# Patient Record
Sex: FEMALE | Race: WHITE | Employment: UNEMPLOYED | URBAN - METROPOLITAN AREA
[De-identification: names, ages, dates, MRNs, and addresses within clinical notes are randomized per-mention and may not be internally consistent; named-entity substitution may affect disease eponyms.]

---

## 2021-04-27 NOTE — PROGRESS NOTES
Assessment and Plan   Diagnoses and all orders for this visit:    1. Anxiety  -     sertraline (ZOLOFT) 50 mg tablet; Take 1 Tab by mouth daily. Initially was on this medication with stress after her fourth child. He eventually tapered off the medication. However, with the new move, she has had increased rest and went back on the medication in December. Working well for her. Continue Zoloft 50 mg. No medication changes recommended. Okay to wean if she feels she does not need it anymore    2. Androgen excess  -     spironolactone (ALDACTONE) 100 mg tablet; Take 1 Tab by mouth daily. 3. PCOS (polycystic ovarian syndrome)  Diagnosed with PCOS when she was younger. Was started on spironolactone after her pregnancy due to significant overproduction of oil and skin issues. She has tried coming off the medications in the past but had return of symptoms. Advised to continue spironolactone 100 mg daily. If she is interested in coming off the medication in the future, we could try. 4. Fatigue, unspecified type  -     CBC WITH AUTOMATED DIFF; Future  -     HEMOGLOBIN A1C WITH EAG; Future  -     METABOLIC PANEL, COMPREHENSIVE; Future  -     LIPID PANEL; Future  -     TSH 3RD GENERATION; Future  -     T4, FREE; Future  -     IRON PROFILE; Future  -     FERRITIN; Future  -     VITAMIN B12 & FOLATE; Future  -     VITAMIN D, 25 HYDROXY; Future  Reports sleeping well and having a good diet. Has had a hard time with Covid and the move. Likely multifactorial.  We will get lab work today and discuss further at next visit    5. Mild intermittent asthma without complication  Not much of an issue since her childhood. Will have flares when her allergies are bad. Benefits, risks, possible drug interactions, and side effects of all new medications were reviewed with the patient. Pt verbalized understanding.     Return to clinic: Earliest convenience to talk about fatigue    An electronic signature was used to authenticate this note. Phillip Chacon MD  [facility]  2021    Future Appointments   Date Time Provider Quin Batron    31:99 AM Jacob Rodriguez MD [facility] BS AMB        History of Present Illness   Chief Complaint   Establish care    Jimmy Long is a 38 y.o. female         Review of Systems   Constitutional: Negative for chills and fever. HENT: Negative for hearing loss. Eyes: Negative for blurred vision. Respiratory: Negative for shortness of breath. Cardiovascular: Negative for chest pain. Gastrointestinal: Negative for abdominal pain, blood in stool, constipation, diarrhea, melena, nausea and vomiting. Genitourinary: Negative for dysuria and hematuria. Musculoskeletal: Negative for joint pain. Skin: Negative for rash. Neurological: Negative for headaches. Past Medical History     Allergies   Allergen Reactions    Cervidil [Dinoprostone] Swelling     Severe swelling of cervical and vaginal area    Erythromycin Nausea and Vomiting        Current Outpatient Medications   Medication Sig    multivitamin (ONE A DAY) tablet Take 1 Tab by mouth daily.  sertraline (ZOLOFT) 50 mg tablet Take 1 Tab by mouth daily.  spironolactone (ALDACTONE) 100 mg tablet Take 1 Tab by mouth daily.  cetirizine (ZYRTEC) 10 mg tablet Take 10 mg by mouth daily as needed. No current facility-administered medications for this visit.            Patient Active Problem List   Diagnosis Code    Anxiety F41.9    Androgen excess E28.1    PCOS (polycystic ovarian syndrome) E28.2    Fatigue, unspecified type R53.83    Mild intermittent asthma without complication F85.40     Past Surgical History:   Procedure Laterality Date    HX  SECTION      HX OTHER SURGICAL  2002    wisdom teeth      Social History     Tobacco Use    Smoking status: Never Smoker    Smokeless tobacco: Never Used   Substance Use Topics    Alcohol use: Yes     Comment: 1-2 drinks/week      Family History   Problem Relation Age of Onset    Diabetes Father     Heart Disease Father         cabg, no MI    Hypertension Father     Diabetes Maternal Grandfather     Stroke Maternal Grandfather     Cancer Neg Hx         Physical Exam   Vitals:       Visit Vitals  /68 (BP 1 Location: Left upper arm, BP Patient Position: Sitting, BP Cuff Size: Adult)   Pulse 72   Temp 98.5 °F (36.9 °C) (Oral)   Resp 16   Ht 5' 4\" (1.626 m)   Wt 131 lb (59.4 kg)   LMP 04/08/2021 (Approximate)   SpO2 99%   Breastfeeding No   BMI 22.49 kg/m²        Physical Exam  Constitutional:       General: She is not in acute distress. Appearance: She is well-developed. HENT:      Right Ear: Tympanic membrane, ear canal and external ear normal.      Left Ear: Tympanic membrane, ear canal and external ear normal.   Eyes:      Extraocular Movements: Extraocular movements intact. Conjunctiva/sclera: Conjunctivae normal.   Neck:      Musculoskeletal: Neck supple. Cardiovascular:      Rate and Rhythm: Normal rate and regular rhythm. Pulses: Normal pulses. Heart sounds: No murmur. No friction rub. No gallop. Pulmonary:      Effort: No respiratory distress. Breath sounds: No wheezing, rhonchi or rales. Abdominal:      General: Bowel sounds are normal. There is no distension. Palpations: Abdomen is soft. There is no hepatomegaly, splenomegaly or mass. Tenderness: There is no abdominal tenderness. There is no guarding. Skin:     General: Skin is warm. Findings: No rash. Neurological:      Mental Status: She is alert.

## 2021-04-28 ENCOUNTER — OFFICE VISIT (OUTPATIENT)
Dept: INTERNAL MEDICINE CLINIC | Age: 36
End: 2021-04-28
Payer: COMMERCIAL

## 2021-04-28 VITALS
HEIGHT: 64 IN | HEART RATE: 72 BPM | DIASTOLIC BLOOD PRESSURE: 68 MMHG | OXYGEN SATURATION: 99 % | WEIGHT: 131 LBS | SYSTOLIC BLOOD PRESSURE: 110 MMHG | BODY MASS INDEX: 22.36 KG/M2 | TEMPERATURE: 98.5 F | RESPIRATION RATE: 16 BRPM

## 2021-04-28 DIAGNOSIS — R53.83 FATIGUE, UNSPECIFIED TYPE: ICD-10-CM

## 2021-04-28 DIAGNOSIS — F41.9 ANXIETY: Primary | ICD-10-CM

## 2021-04-28 DIAGNOSIS — E28.1 ANDROGEN EXCESS: ICD-10-CM

## 2021-04-28 DIAGNOSIS — J45.20 MILD INTERMITTENT ASTHMA WITHOUT COMPLICATION: ICD-10-CM

## 2021-04-28 DIAGNOSIS — E28.2 PCOS (POLYCYSTIC OVARIAN SYNDROME): ICD-10-CM

## 2021-04-28 PROCEDURE — 99204 OFFICE O/P NEW MOD 45 MIN: CPT | Performed by: INTERNAL MEDICINE

## 2021-04-28 RX ORDER — BISMUTH SUBSALICYLATE 262 MG
1 TABLET,CHEWABLE ORAL DAILY
COMMUNITY

## 2021-04-28 RX ORDER — SERTRALINE HYDROCHLORIDE 50 MG/1
50 TABLET, FILM COATED ORAL DAILY
Qty: 90 TAB | Refills: 1 | Status: SHIPPED | OUTPATIENT
Start: 2021-04-28 | End: 2022-06-20

## 2021-04-28 RX ORDER — SPIRONOLACTONE 100 MG/1
100 TABLET, FILM COATED ORAL DAILY
Qty: 90 TAB | Refills: 3 | Status: SHIPPED | OUTPATIENT
Start: 2021-04-28 | End: 2022-06-20

## 2022-03-18 PROBLEM — E28.2 PCOS (POLYCYSTIC OVARIAN SYNDROME): Status: ACTIVE | Noted: 2021-04-28

## 2022-03-18 PROBLEM — F41.9 ANXIETY: Status: ACTIVE | Noted: 2021-04-28

## 2022-03-19 PROBLEM — E28.1 ANDROGEN EXCESS: Status: ACTIVE | Noted: 2021-04-28

## 2022-03-20 PROBLEM — J45.20 MILD INTERMITTENT ASTHMA WITHOUT COMPLICATION: Status: ACTIVE | Noted: 2021-04-28

## 2022-03-20 PROBLEM — R53.83 FATIGUE, UNSPECIFIED TYPE: Status: ACTIVE | Noted: 2021-04-28

## 2022-06-20 ENCOUNTER — OFFICE VISIT (OUTPATIENT)
Dept: INTERNAL MEDICINE CLINIC | Age: 37
End: 2022-06-20
Payer: COMMERCIAL

## 2022-06-20 VITALS
WEIGHT: 129 LBS | HEIGHT: 64 IN | HEART RATE: 70 BPM | DIASTOLIC BLOOD PRESSURE: 69 MMHG | SYSTOLIC BLOOD PRESSURE: 109 MMHG | BODY MASS INDEX: 22.02 KG/M2 | TEMPERATURE: 98 F | OXYGEN SATURATION: 100 %

## 2022-06-20 DIAGNOSIS — F41.9 ANXIETY: ICD-10-CM

## 2022-06-20 DIAGNOSIS — R00.2 PALPITATIONS: ICD-10-CM

## 2022-06-20 DIAGNOSIS — E28.2 PCOS (POLYCYSTIC OVARIAN SYNDROME): ICD-10-CM

## 2022-06-20 DIAGNOSIS — Z11.59 NEED FOR HEPATITIS C SCREENING TEST: ICD-10-CM

## 2022-06-20 DIAGNOSIS — G43.011 INTRACTABLE MIGRAINE WITHOUT AURA AND WITH STATUS MIGRAINOSUS: Primary | ICD-10-CM

## 2022-06-20 PROCEDURE — 93000 ELECTROCARDIOGRAM COMPLETE: CPT | Performed by: INTERNAL MEDICINE

## 2022-06-20 PROCEDURE — 99215 OFFICE O/P EST HI 40 MIN: CPT | Performed by: INTERNAL MEDICINE

## 2022-06-20 PROCEDURE — 96372 THER/PROPH/DIAG INJ SC/IM: CPT | Performed by: INTERNAL MEDICINE

## 2022-06-20 RX ORDER — KETOROLAC TROMETHAMINE 30 MG/ML
60 INJECTION, SOLUTION INTRAMUSCULAR; INTRAVENOUS ONCE
Status: COMPLETED | OUTPATIENT
Start: 2022-06-20 | End: 2022-06-20

## 2022-06-20 RX ORDER — KETOROLAC TROMETHAMINE 30 MG/ML
60 INJECTION, SOLUTION INTRAMUSCULAR; INTRAVENOUS ONCE
Status: DISCONTINUED | OUTPATIENT
Start: 2022-06-20 | End: 2022-06-20 | Stop reason: SDUPTHER

## 2022-06-20 RX ORDER — KETOROLAC TROMETHAMINE 30 MG/ML
60 INJECTION, SOLUTION INTRAMUSCULAR; INTRAVENOUS ONCE
Qty: 1 ML | Refills: 0
Start: 2022-06-20 | End: 2022-06-20

## 2022-06-20 RX ORDER — DEXTROAMPHETAMINE SACCHARATE, AMPHETAMINE ASPARTATE, DEXTROAMPHETAMINE SULFATE AND AMPHETAMINE SULFATE 1.25; 1.25; 1.25; 1.25 MG/1; MG/1; MG/1; MG/1
5 TABLET ORAL 2 TIMES DAILY
COMMUNITY
Start: 2022-06-02

## 2022-06-20 RX ORDER — SUMATRIPTAN 50 MG/1
50 TABLET, FILM COATED ORAL
Qty: 10 TABLET | Refills: 0 | Status: SHIPPED | OUTPATIENT
Start: 2022-06-20 | End: 2022-06-20

## 2022-06-20 RX ADMIN — KETOROLAC TROMETHAMINE 60 MG: 30 INJECTION, SOLUTION INTRAMUSCULAR; INTRAVENOUS at 16:50

## 2022-06-20 NOTE — ASSESSMENT & PLAN NOTE
Driving home from [place] last week - started getting tunnel vision, blurred vision, felt like might pass out   Been feeling \"heavy\" since then  Has had a HA present since then that hasn't gone away, lot of pressure in her head and ears  Morning of episode - was feeling really tired   Drove to [place] the next day - still having a HA  Had a couple more episodes while walking in the store - tunnel vision, couldn't think/see straight  Reaction time slower  Fumbling a lot   Having a hard time putting thoughts together   A lot of pressure in head/ears   Doesn't feel comfortable driving because of symptoms   \"I feel drunk\"  Maybe not getting a lot of rest while in [place]  Light makes it worse   Lot of eye tearing when was driving from [place]   No nausea, vomiting  Before today, would rate pressure 8/10  Has been taking advil as needed   Maybe not eating a lot while in [place], but has been eating more in the past week  Drinking a lot of fluids   No CP, SOB  occl palpitations in the past week   Neck/back issues - usually sees chiropractor monthly but hasn't been as consistent with that  TIA vs migraine - 2009, 2mo prior came off OCPs, negative workup - This episode lasted only a couple of hours   Took a friend's xanax - helped with sleep  Neuro exam normal   Cardiac exam normal; ekg with short TX but otherwise normal; sx less likely cardiac  Orthostatics negative  HEENT normal - unlikely infection  Migraine?  rec toradol shot and imitrex if needed.   Advised to call if sx do not improve

## 2022-06-20 NOTE — ASSESSMENT & PLAN NOTE
4/28/21 - Initially was on this medication with stress after her fourth child. Praful Vang eventually tapered off the medication. Carole Judy, with the new move, she has had increased rest and went back on the medication in December.  Working well for her.     Continue Zoloft 50 mg.  No medication changes recommended. Loralie Route to wean if she feels she does not need it anymore  ===  Treating ADHD has helped the anxiety, off sertraline now

## 2022-06-20 NOTE — ASSESSMENT & PLAN NOTE
4/28/21-Diagnosed with PCOS when she was younger. Benson Roque started on spironolactone after her pregnancy due to significant overproduction of oil and skin issues. Sania Chano has tried coming off the medications in the past but had return of symptoms.     Advised to continue spironolactone 100 mg daily.  If she is interested in coming off the medication in the future, we could try.  ===  Off spironolactone in the past couple months

## 2022-06-20 NOTE — PROGRESS NOTES
Identified pt with two pt identifiers. Reviewed record in preparation for visit and have obtained necessary documentation. All patient medications has been reviewed. Chief Complaint   Patient presents with    Dizziness     started last Monday blurry vision, foggy, fatigue and lightheadness    Head Pain    Neck Pain     Additional information about chief complaint:    Visit Vitals  /69 (BP 1 Location: Left upper arm, BP Patient Position: Sitting, BP Cuff Size: Adult)   Pulse 70   Temp 98 °F (36.7 °C) (Temporal)   Ht 5' 4\" (1.626 m)   Wt 129 lb (58.5 kg)   SpO2 100%   BMI 22.14 kg/m²       Health Maintenance Due   Topic    Hepatitis C Screening     Depression Screen     COVID-19 Vaccine (1)    Pneumococcal 0-64 years (1 - PCV)    Cervical cancer screen        1. Have you been to the ER, urgent care clinic since your last visit? Hospitalized since your last visit? No    2. Have you seen or consulted any other health care providers outside of the [address] [place] [address] since your last visit? Include any pap smears or colon screening.  No

## 2022-06-22 ENCOUNTER — TELEPHONE (OUTPATIENT)
Dept: INTERNAL MEDICINE CLINIC | Age: 37
End: 2022-06-22

## 2022-06-22 DIAGNOSIS — G43.011 INTRACTABLE MIGRAINE WITHOUT AURA AND WITH STATUS MIGRAINOSUS: Primary | ICD-10-CM

## 2022-06-22 RX ORDER — KETOROLAC TROMETHAMINE 10 MG/1
10 TABLET, FILM COATED ORAL
Qty: 10 TABLET | Refills: 0 | Status: SHIPPED | OUTPATIENT
Start: 2022-06-22 | End: 2022-07-20

## 2022-06-22 NOTE — TELEPHONE ENCOUNTER
Yes, she can take another tab of imitrex (max 2 tabs/day). If the toradol worked, I sent in a PO version of toradol to the pharmacy which she can try as needed. Okay to take po toradol with imitrex.   Call if no improvement - could consider steroid pack for intractable migraine

## 2022-06-22 NOTE — TELEPHONE ENCOUNTER
Pt was seen on Monday given Toradol for migraine, immediatly starting working within 5 hours she felt nml. Pt didn't feel the Imitrex the following day because she felt fine. Pt got a massage yesterday to loosen neck muscles. Pt woke up this AM with frontal migraine pain, back of right side of neck, right eyebrow. Pt took Advil which helps with pain but she is still having dizziness, slow reaction time. Pt got the Imitrex from the pharmacy today, took 1 tablet around 10am. No improvement since taking Imitrex. Pain is improved since taking advil but the lightheaded feeling, can't function, light sensitivity is still present. Reflex time is slow. All of these symptoms went away with the Toradol injection. These sx restarted this AM.     Can she take more than 1 Imitrex? Due to sx she doesn't feel comfortable driving.

## 2022-07-20 ENCOUNTER — OFFICE VISIT (OUTPATIENT)
Dept: INTERNAL MEDICINE CLINIC | Age: 37
End: 2022-07-20
Payer: COMMERCIAL

## 2022-07-20 VITALS
WEIGHT: 128 LBS | DIASTOLIC BLOOD PRESSURE: 64 MMHG | RESPIRATION RATE: 17 BRPM | HEART RATE: 83 BPM | HEIGHT: 64 IN | SYSTOLIC BLOOD PRESSURE: 100 MMHG | OXYGEN SATURATION: 97 % | BODY MASS INDEX: 21.85 KG/M2

## 2022-07-20 DIAGNOSIS — F98.8 ATTENTION DEFICIT DISORDER (ADD) WITHOUT HYPERACTIVITY: ICD-10-CM

## 2022-07-20 DIAGNOSIS — G43.011 INTRACTABLE MIGRAINE WITHOUT AURA AND WITH STATUS MIGRAINOSUS: ICD-10-CM

## 2022-07-20 DIAGNOSIS — Z00.00 WELL ADULT EXAM: Primary | ICD-10-CM

## 2022-07-20 PROCEDURE — 99395 PREV VISIT EST AGE 18-39: CPT | Performed by: INTERNAL MEDICINE

## 2022-07-20 RX ORDER — SUMATRIPTAN 50 MG/1
50 TABLET, FILM COATED ORAL
COMMUNITY

## 2022-07-20 NOTE — PROGRESS NOTES
Assessment and Plan     Ferritin 14. Iron saturation low 18%. B12 319. Folate normal.  Vitamin D 51. Thyroid studies normal.  Hep C negative. Lipid panel normal.  A1c normal. CMP normal.  CBC normal     Iron low side but normal Hgb 12.8 -recommend multivitamin with iron  Labs discussed with patient    1. Well adult exam  Assessment & Plan: Thinks pap august 2019, Dr. Dove Him VPFW -signed release  Did get covid booster   She would like to hold off on pneumonia vaccine for now  2. Intractable migraine without aura and with status migrainosus  Assessment & Plan:  Symptoms resolved after Toradol shot after last visit. Did return but resolved again with Imitrex. Did not need to take any oral Toradol  Symptoms were likely related to migraine. Continue Imitrex as needed  3. Attention deficit disorder (ADD) without hyperactivity  Assessment & Plan:   monitored by specialist. No acute findings meriting change in the plan   VPFW prescribing adderall - women's health consultation     Benefits, risks, possible drug interactions, and side effects of all new medications were reviewed with the patient. Pt verbalized understanding. Return to clinic: 1 year for physical or earlier if needed  originally from [facility] [place], lived in [place], moved to [email or web address] for [employer]'s job but he went virtual so they came back  4 kids - 11, 10, 7, 8yo  reaing, piano, gardening, walking, [place], yoga    An electronic signature was used to authenticate this note. Edwin Roy MD  [facility]  7/20/2022    No future appointments. History of Present Illness   Chief Complaint   Physical    Curtistine Rio is a 43 y.o. female         Review of Systems   Constitutional:  Negative for chills and fever. HENT:  Negative for hearing loss. Eyes:  Negative for blurred vision. Respiratory:  Negative for shortness of breath. Cardiovascular:  Negative for chest pain.    Gastrointestinal: Negative for abdominal pain, blood in stool, constipation, diarrhea, melena, nausea and vomiting. Genitourinary:  Negative for dysuria and hematuria. Musculoskeletal:  Negative for joint pain. Skin:  Negative for rash. Neurological:  Negative for headaches. Past Medical History     Allergies   Allergen Reactions    Cervidil [Dinoprostone] Swelling     Severe swelling of cervical and vaginal area    Erythromycin Nausea and Vomiting        Current Outpatient Medications   Medication Sig    SUMAtriptan (IMITREX) 50 mg tablet Take 50 mg by mouth once as needed. dextroamphetamine-amphetamine (ADDERALL) 5 mg tablet Take 5 mg by mouth two (2) times a day. multivitamin (ONE A DAY) tablet Take 1 Tab by mouth daily. cetirizine (ZYRTEC) 10 mg tablet Take 10 mg by mouth daily as needed. No current facility-administered medications for this visit.           Patient Active Problem List   Diagnosis Code    Anxiety F41.9    Androgen excess E28.1    PCOS (polycystic ovarian syndrome) E28.2    Fatigue, unspecified type R53.83    Mild intermittent asthma without complication L68.27    Intractable migraine without aura and with status migrainosus G43.011    Well adult exam Z00.00    Attention deficit disorder (ADD) without hyperactivity F98.8     Past Surgical History:   Procedure Laterality Date    HX  SECTION      HX OTHER SURGICAL  2002    wisdom teeth      Social History     Tobacco Use    Smoking status: Never    Smokeless tobacco: Never   Substance Use Topics    Alcohol use: Yes     Comment: 0-2 drinks/week      Family History   Problem Relation Age of Onset    Diabetes Father     Heart Disease Father         cabg, no MI    Hypertension Father     Diabetes Maternal Grandfather     Stroke Maternal Grandfather     Cancer Neg Hx         Physical Exam   Vitals:       Visit Vitals  /64   Pulse 83   Resp 17   Ht 5' 4\" (1.626 m)   Wt 128 lb (58.1 kg)   LMP 2022 (Exact Date)   SpO2 97%   BMI 21.97 kg/m²        Physical Exam  Constitutional:       General: She is not in acute distress. Appearance: She is well-developed. HENT:      Right Ear: Tympanic membrane, ear canal and external ear normal.      Left Ear: Tympanic membrane, ear canal and external ear normal.   Eyes:      Extraocular Movements: Extraocular movements intact. Conjunctiva/sclera: Conjunctivae normal.   Cardiovascular:      Rate and Rhythm: Normal rate and regular rhythm. Pulses: Normal pulses. Heart sounds: No murmur heard. No friction rub. No gallop. Pulmonary:      Effort: No respiratory distress. Breath sounds: No wheezing, rhonchi or rales. Abdominal:      General: Bowel sounds are normal. There is no distension. Palpations: Abdomen is soft. There is no hepatomegaly, splenomegaly or mass. Tenderness: There is no abdominal tenderness. There is no guarding. Musculoskeletal:      Cervical back: Neck supple. Right lower leg: No edema. Left lower leg: No edema. Lymphadenopathy:      Cervical: No cervical adenopathy. Skin:     General: Skin is warm. Findings: No rash. Neurological:      Mental Status: She is alert.

## 2022-07-20 NOTE — ASSESSMENT & PLAN NOTE
Thinks pap august 2019, Dr. Glen Holm VPFW -signed release  Did get covid booster   She would like to hold off on pneumonia vaccine for now

## 2022-07-20 NOTE — ASSESSMENT & PLAN NOTE
monitored by specialist. No acute findings meriting change in the plan   VPFW prescribing adderall - women's health consultation

## 2022-07-20 NOTE — PROGRESS NOTES
Room:     Identified pt with two pt identifiers(name and ). Reviewed record in preparation for visit and have obtained necessary documentation. All patient medications has been reviewed. Chief Complaint   Patient presents with    Complete Physical       Health Maintenance Due   Topic    Pneumococcal 0-64 years (1 - PCV)    Cervical cancer screen     COVID-19 Vaccine (3 - Booster for Moderna series)       Vitals:    22 1430   Weight: 128 lb (58.1 kg)   Height: 5' 4\" (1.626 m)   PainSc:   0 - No pain   LMP: 2022       4. Have you been to the ER, urgent care clinic since your last visit? Hospitalized since your last visit? {Yes when where and reason for visit:}    5. Have you seen or consulted any other health care providers outside of the [address] [place] [address] since your last visit? Include any pap smears or colon screening. {Yes when where and reason for visit:}    6. Would you like to receive your flu shot today? {YES/NO:83456}    8. Do you have an Advanced Directive/ Living Will in place? {YES/NO:34834}  If yes, do we have a copy on file {YES/NO:26389}  If no, would you like information {YES/NO:67589}    Patient is accompanied by {:64042} I have received verbal consent from Kelly Franklin to discuss any/all medical information while they are present in the room. Room:     Identified pt with two pt identifiers(name and ). Reviewed record in preparation for visit and have obtained necessary documentation. All patient medications has been reviewed.     Chief Complaint   Patient presents with    Complete Physical       Health Maintenance Due   Topic    Pneumococcal 0-64 years (1 - PCV)    Cervical cancer screen     COVID-19 Vaccine (3 - Booster for Moderna series)       Vitals:    22 1430   Weight: 128 lb (58.1 kg)   Height: 5' 4\" (1.626 m)   PainSc:   0 - No pain   LMP: 2022

## 2022-07-20 NOTE — ASSESSMENT & PLAN NOTE
Symptoms resolved after Toradol shot after last visit. Did return but resolved again with Imitrex. Did not need to take any oral Toradol  Symptoms were likely related to migraine.   Continue Imitrex as needed

## 2022-08-19 PROBLEM — Z00.00 WELL ADULT EXAM: Status: RESOLVED | Noted: 2022-07-20 | Resolved: 2022-08-19

## 2023-05-03 ENCOUNTER — TELEPHONE (OUTPATIENT)
Dept: INTERNAL MEDICINE CLINIC | Age: 38
End: 2023-05-03

## 2023-05-03 DIAGNOSIS — F41.9 ANXIETY: Primary | ICD-10-CM

## 2023-05-05 ENCOUNTER — OFFICE VISIT (OUTPATIENT)
Dept: INTERNAL MEDICINE CLINIC | Age: 38
End: 2023-05-05

## 2023-05-05 VITALS
BODY MASS INDEX: 21.75 KG/M2 | TEMPERATURE: 98.5 F | WEIGHT: 127.4 LBS | DIASTOLIC BLOOD PRESSURE: 67 MMHG | HEIGHT: 64 IN | HEART RATE: 94 BPM | OXYGEN SATURATION: 99 % | RESPIRATION RATE: 14 BRPM | SYSTOLIC BLOOD PRESSURE: 106 MMHG

## 2023-05-05 DIAGNOSIS — Z00.00 WELL ADULT EXAM: Primary | ICD-10-CM

## 2023-05-05 DIAGNOSIS — F98.8 ATTENTION DEFICIT DISORDER (ADD) WITHOUT HYPERACTIVITY: ICD-10-CM

## 2023-05-05 DIAGNOSIS — G43.009 MIGRAINE WITHOUT AURA AND WITHOUT STATUS MIGRAINOSUS, NOT INTRACTABLE: ICD-10-CM

## 2023-05-05 RX ORDER — SUMATRIPTAN 50 MG/1
50 TABLET, FILM COATED ORAL
Qty: 8 TABLET | Refills: 11 | Status: SHIPPED | OUTPATIENT
Start: 2023-05-05 | End: 2023-05-05

## 2023-05-05 RX ORDER — METHYLPHENIDATE HYDROCHLORIDE 5 MG/1
TABLET ORAL
COMMUNITY

## 2023-05-05 RX ORDER — LEVOCETIRIZINE DIHYDROCHLORIDE 5 MG/1
TABLET, FILM COATED ORAL
COMMUNITY

## 2024-03-26 ENCOUNTER — TELEPHONE (OUTPATIENT)
Age: 39
End: 2024-03-26

## 2024-03-26 NOTE — TELEPHONE ENCOUNTER
----- Message from Jayjaycaitlin Shafer sent at 3/26/2024  9:04 AM EDT -----  Subject: Message to Provider    QUESTIONS  Information for Provider? Patient was with PCP Jorge and is wanting   to know if anyone at the practice is willing to take her on as their   patient. She would like to stay at the same practice.   ---------------------------------------------------------------------------  --------------  CALL BACK INFO  [phone]; OK to leave message on voicemail  ---------------------------------------------------------------------------  --------------  SCRIPT ANSWERS  Relationship to Patient? Self

## 2024-06-24 ENCOUNTER — OFFICE VISIT (OUTPATIENT)
Age: 39
End: 2024-06-24
Payer: COMMERCIAL

## 2024-06-24 VITALS
OXYGEN SATURATION: 99 % | HEIGHT: 64 IN | HEART RATE: 87 BPM | RESPIRATION RATE: 14 BRPM | SYSTOLIC BLOOD PRESSURE: 114 MMHG | DIASTOLIC BLOOD PRESSURE: 72 MMHG | WEIGHT: 127.4 LBS | BODY MASS INDEX: 21.75 KG/M2

## 2024-06-24 DIAGNOSIS — G43.009 MIGRAINE WITHOUT AURA AND WITHOUT STATUS MIGRAINOSUS, NOT INTRACTABLE: Primary | ICD-10-CM

## 2024-06-24 DIAGNOSIS — E28.2 PCOS (POLYCYSTIC OVARIAN SYNDROME): ICD-10-CM

## 2024-06-24 DIAGNOSIS — H66.002 NON-RECURRENT ACUTE SUPPURATIVE OTITIS MEDIA OF LEFT EAR WITHOUT SPONTANEOUS RUPTURE OF TYMPANIC MEMBRANE: ICD-10-CM

## 2024-06-24 DIAGNOSIS — F98.8 ATTENTION DEFICIT DISORDER (ADD) WITHOUT HYPERACTIVITY: ICD-10-CM

## 2024-06-24 PROBLEM — J45.20 MILD INTERMITTENT ASTHMA WITHOUT COMPLICATION: Status: RESOLVED | Noted: 2021-04-28 | Resolved: 2024-06-24

## 2024-06-24 PROCEDURE — 99214 OFFICE O/P EST MOD 30 MIN: CPT | Performed by: INTERNAL MEDICINE

## 2024-06-24 RX ORDER — LEVOCETIRIZINE DIHYDROCHLORIDE 5 MG/1
5 TABLET, FILM COATED ORAL NIGHTLY
COMMUNITY

## 2024-06-24 RX ORDER — FLUTICASONE PROPIONATE 50 MCG
2 SPRAY, SUSPENSION (ML) NASAL DAILY
COMMUNITY

## 2024-06-24 RX ORDER — METHYLPREDNISOLONE 4 MG/1
TABLET ORAL
Qty: 21 TABLET | Refills: 0 | Status: SHIPPED | OUTPATIENT
Start: 2024-06-24 | End: 2024-06-30

## 2024-06-24 RX ORDER — CEFUROXIME AXETIL 500 MG/1
500 TABLET ORAL 2 TIMES DAILY
Qty: 8 TABLET | Refills: 0 | Status: SHIPPED | OUTPATIENT
Start: 2024-06-24 | End: 2024-06-28

## 2024-06-24 RX ORDER — SUMATRIPTAN 50 MG/1
50 TABLET, FILM COATED ORAL DAILY PRN
Qty: 9 TABLET | Refills: 11 | Status: SHIPPED | OUTPATIENT
Start: 2024-06-24

## 2024-06-24 SDOH — ECONOMIC STABILITY: INCOME INSECURITY: HOW HARD IS IT FOR YOU TO PAY FOR THE VERY BASICS LIKE FOOD, HOUSING, MEDICAL CARE, AND HEATING?: NOT HARD AT ALL

## 2024-06-24 SDOH — ECONOMIC STABILITY: FOOD INSECURITY: WITHIN THE PAST 12 MONTHS, THE FOOD YOU BOUGHT JUST DIDN'T LAST AND YOU DIDN'T HAVE MONEY TO GET MORE.: NEVER TRUE

## 2024-06-24 SDOH — ECONOMIC STABILITY: HOUSING INSECURITY
IN THE LAST 12 MONTHS, WAS THERE A TIME WHEN YOU DID NOT HAVE A STEADY PLACE TO SLEEP OR SLEPT IN A SHELTER (INCLUDING NOW)?: NO

## 2024-06-24 SDOH — ECONOMIC STABILITY: FOOD INSECURITY: WITHIN THE PAST 12 MONTHS, YOU WORRIED THAT YOUR FOOD WOULD RUN OUT BEFORE YOU GOT MONEY TO BUY MORE.: NEVER TRUE

## 2024-06-24 ASSESSMENT — PATIENT HEALTH QUESTIONNAIRE - PHQ9
SUM OF ALL RESPONSES TO PHQ QUESTIONS 1-9: 0
2. FEELING DOWN, DEPRESSED OR HOPELESS: NOT AT ALL
1. LITTLE INTEREST OR PLEASURE IN DOING THINGS: NOT AT ALL
SUM OF ALL RESPONSES TO PHQ QUESTIONS 1-9: 0
SUM OF ALL RESPONSES TO PHQ9 QUESTIONS 1 & 2: 0
SUM OF ALL RESPONSES TO PHQ QUESTIONS 1-9: 0
SUM OF ALL RESPONSES TO PHQ QUESTIONS 1-9: 0

## 2024-06-24 NOTE — PROGRESS NOTES
Theresa De Anda (:  1985) is a 38 y.o. female,Established patient, here for evaluation of the following chief complaint(s):  New Patient and fluid in ear (Left ear)      Assessment & Plan       1. Migraine without aura and without status migrainosus, not intractable  Stable, doing well on sumatriptan.  Refilling medication today.  - SUMAtriptan (IMITREX) 50 MG tablet; Take 1 tablet by mouth daily as needed for Migraine (May repeat in 2 hours x 1)  Dispense: 9 tablet; Refill: 11    2. Attention deficit disorder (ADD) without hyperactivity  Not currently treated.  We may consider Strattera in the future; however, she is doing well off medication for now.    3. PCOS (polycystic ovarian syndrome)  She will follow-up for fasting labs.  - Comprehensive Metabolic Panel; Future  - Lipid Panel; Future  - CBC with Auto Differential; Future    4. Non-recurrent acute suppurative otitis media of left ear without spontaneous rupture of tympanic membrane  Continued, mild otitis media, with effusion.  Will treat with Medrol Dosepak and 4 days of cefuroxime.  - cefUROXime (CEFTIN) 500 MG tablet; Take 1 tablet by mouth 2 times daily for 4 days  Dispense: 8 tablet; Refill: 0  - methylPREDNISolone (MEDROL, BRENDA,) 4 MG tablet; Take by mouth.  Dispense: 21 tablet; Refill: 0         Follow up: 1 year for CPE       Subjective   HPI    Ear issue: Ms. De Anda feels like she may have fluid in her left ear. [place], felt her ear was draining fluid, and when she went platform driving. She went to a provider in the [place], was given drops and took amoxicillin/clavulonate for five days. It is still feeling like it gets plugged up, that she can't hear as well, and it is still full. She does have some allergies, underlying, as well.     Migraine: She has been taking sumatriptan for ocular migraines with headaches, as needed, for a couple years. She had been given Toradol, but never filled it. She gets good relief with

## 2024-10-02 ENCOUNTER — OFFICE VISIT (OUTPATIENT)
Age: 39
End: 2024-10-02
Payer: COMMERCIAL

## 2024-10-02 VITALS
RESPIRATION RATE: 14 BRPM | BODY MASS INDEX: 22.16 KG/M2 | DIASTOLIC BLOOD PRESSURE: 71 MMHG | HEIGHT: 64 IN | SYSTOLIC BLOOD PRESSURE: 113 MMHG | OXYGEN SATURATION: 99 % | WEIGHT: 129.8 LBS | HEART RATE: 75 BPM

## 2024-10-02 DIAGNOSIS — H66.3X1 CHRONIC SUPPURATIVE OTITIS MEDIA OF RIGHT EAR, UNSPECIFIED OTITIS MEDIA LOCATION: Primary | ICD-10-CM

## 2024-10-02 DIAGNOSIS — H92.01 RIGHT EAR PAIN: ICD-10-CM

## 2024-10-02 PROCEDURE — 99213 OFFICE O/P EST LOW 20 MIN: CPT | Performed by: INTERNAL MEDICINE

## 2024-10-02 RX ORDER — CIPROFLOXACIN AND DEXAMETHASONE 3; 1 MG/ML; MG/ML
4 SUSPENSION/ DROPS AURICULAR (OTIC) 2 TIMES DAILY
Qty: 7.5 ML | Refills: 0 | Status: SHIPPED | OUTPATIENT
Start: 2024-10-02 | End: 2024-10-07

## 2024-10-02 ASSESSMENT — PATIENT HEALTH QUESTIONNAIRE - PHQ9
2. FEELING DOWN, DEPRESSED OR HOPELESS: NOT AT ALL
SUM OF ALL RESPONSES TO PHQ QUESTIONS 1-9: 0
SUM OF ALL RESPONSES TO PHQ9 QUESTIONS 1 & 2: 0
1. LITTLE INTEREST OR PLEASURE IN DOING THINGS: NOT AT ALL

## 2024-10-02 NOTE — PROGRESS NOTES
A/P:       1. Chronic suppurative otitis media of right ear, unspecified otitis media location  Possible otitis media with perforation, though cannot rule out otitis externa.  She has been treated with high-dose amoxicillin with little improvement.  Referring to ENT as noted below.  In the interval, she will use Ciprodex twice daily for the next 5 days.    2. Right ear pain  Persistent, with signs of some otitis externa and possible perforation on exam.  Referring to ENT and treating as noted above.  - [facility] - Librado Devries MD Otolaryngology, [place]  - ciprofloxacin-dexAMETHasone (CIPRODEX) 0.3-0.1 % otic suspension; Place 4 drops into the right ear 2 times daily for 5 days  Dispense: 7.5 mL; Refill: 0       Follow up:  prn         An electronic signature was used to authenticate this note.    --Carina Bolanos MD         HPI: Theresa De Anda is here for an acute visit.      Ear infection: Ms. De Anda has had issues with her right ear for about a month. It started with fullness, and at urgent care she was noted to have debris in the canal, was started on amoxicillin 875 mg twice daily for seven days, on 9/4. They also told her to use Debrox (felt it run down the back of her throat), but she ended up stopping when her neighbor looked with an otoscope and saw a lot of swelling and \"mess\" in her canal. She took 60 mg of prednisone for five days more than a week ago, but it continues to drain nightly, feels full all the time and her hearing is decreased.          Current Outpatient Medications:     levocetirizine (XYZAL) 5 MG tablet, Take 1 tablet by mouth nightly, Disp: , Rfl:     fluticasone (FLONASE) 50 MCG/ACT nasal spray, 2 sprays by Each Nostril route daily, Disp: , Rfl:     SUMAtriptan (IMITREX) 50 MG tablet, Take 1 tablet by mouth daily as needed for Migraine (May repeat in 2 hours x 1), Disp: 9 tablet, Rfl: 11       Allergies   Allergen Reactions    Dinoprostone Swelling     Severe swelling of cervical and

## 2025-06-29 ENCOUNTER — OFFICE VISIT (OUTPATIENT)
Age: 40
End: 2025-06-29

## 2025-06-29 VITALS
HEIGHT: 63 IN | OXYGEN SATURATION: 99 % | HEART RATE: 67 BPM | DIASTOLIC BLOOD PRESSURE: 65 MMHG | TEMPERATURE: 98.2 F | RESPIRATION RATE: 18 BRPM | SYSTOLIC BLOOD PRESSURE: 101 MMHG | WEIGHT: 135 LBS | BODY MASS INDEX: 23.92 KG/M2

## 2025-06-29 DIAGNOSIS — H60.393 ACUTE INFECTIVE OTITIS EXTERNA, BILATERAL: Primary | ICD-10-CM

## 2025-06-29 PROBLEM — N92.0 MENORRHAGIA: Status: ACTIVE | Noted: 2025-03-24

## 2025-06-29 PROBLEM — L70.0 COMEDONAL ACNE: Status: ACTIVE | Noted: 2020-09-16

## 2025-06-29 PROBLEM — F32.81 PMDD (PREMENSTRUAL DYSPHORIC DISORDER): Status: ACTIVE | Noted: 2020-09-16

## 2025-06-29 PROBLEM — N94.6 DYSMENORRHEA: Status: ACTIVE | Noted: 2020-09-16

## 2025-06-29 RX ORDER — CIPROFLOXACIN AND DEXAMETHASONE 3; 1 MG/ML; MG/ML
4 SUSPENSION/ DROPS AURICULAR (OTIC) 2 TIMES DAILY
Qty: 7.5 ML | Refills: 0 | Status: SHIPPED | OUTPATIENT
Start: 2025-06-29 | End: 2025-07-06

## 2025-08-14 DIAGNOSIS — G43.009 MIGRAINE WITHOUT AURA AND WITHOUT STATUS MIGRAINOSUS, NOT INTRACTABLE: ICD-10-CM

## 2025-08-15 ENCOUNTER — OFFICE VISIT (OUTPATIENT)
Age: 40
End: 2025-08-15

## 2025-08-15 VITALS
WEIGHT: 132 LBS | HEART RATE: 74 BPM | SYSTOLIC BLOOD PRESSURE: 111 MMHG | RESPIRATION RATE: 18 BRPM | OXYGEN SATURATION: 98 % | BODY MASS INDEX: 22.53 KG/M2 | HEIGHT: 64 IN | DIASTOLIC BLOOD PRESSURE: 72 MMHG | TEMPERATURE: 98.4 F

## 2025-08-15 DIAGNOSIS — M94.0 COSTOCHONDRITIS: ICD-10-CM

## 2025-08-15 DIAGNOSIS — H66.001 ACUTE SUPPURATIVE OTITIS MEDIA OF RIGHT EAR WITHOUT SPONTANEOUS RUPTURE OF TYMPANIC MEMBRANE, RECURRENCE NOT SPECIFIED: ICD-10-CM

## 2025-08-15 DIAGNOSIS — G43.809 OTHER MIGRAINE WITHOUT STATUS MIGRAINOSUS, NOT INTRACTABLE: Primary | ICD-10-CM

## 2025-08-15 RX ORDER — AMPHETAMINE 5 MG/1
1 TABLET, EXTENDED RELEASE ORAL DAILY
COMMUNITY
Start: 2025-07-12

## 2025-08-15 RX ORDER — SUMATRIPTAN 50 MG/1
TABLET, FILM COATED ORAL
Qty: 9 TABLET | Refills: 11 | Status: SHIPPED | OUTPATIENT
Start: 2025-08-15

## 2025-08-15 RX ORDER — BUTALBITAL, ACETAMINOPHEN AND CAFFEINE 50; 325; 40 MG/1; MG/1; MG/1
1 TABLET ORAL EVERY 4 HOURS PRN
Qty: 15 TABLET | Refills: 0 | COMMUNITY
End: 2025-08-15

## 2025-08-15 RX ORDER — AMOXICILLIN 875 MG/1
875 TABLET, COATED ORAL 2 TIMES DAILY
Qty: 20 TABLET | Refills: 0 | Status: SHIPPED | OUTPATIENT
Start: 2025-08-15 | End: 2025-08-25

## 2025-08-15 RX ORDER — BUTALBITAL, ACETAMINOPHEN AND CAFFEINE 50; 325; 40 MG/1; MG/1; MG/1
1 TABLET ORAL EVERY 4 HOURS PRN
Qty: 20 TABLET | Refills: 0 | Status: SHIPPED | OUTPATIENT
Start: 2025-08-15

## 2025-08-15 RX ORDER — KETOROLAC TROMETHAMINE 30 MG/ML
30 INJECTION, SOLUTION INTRAMUSCULAR; INTRAVENOUS ONCE
Status: COMPLETED | OUTPATIENT
Start: 2025-08-15 | End: 2025-08-15

## 2025-08-15 RX ADMIN — KETOROLAC TROMETHAMINE 30 MG: 30 INJECTION, SOLUTION INTRAMUSCULAR; INTRAVENOUS at 14:46

## 2025-08-15 ASSESSMENT — ENCOUNTER SYMPTOMS
COUGH: 0
SORE THROAT: 0
SHORTNESS OF BREATH: 0
SINUS PRESSURE: 1
WHEEZING: 0
CHEST TIGHTNESS: 1